# Patient Record
Sex: FEMALE | Race: ASIAN | Employment: STUDENT | URBAN - METROPOLITAN AREA
[De-identification: names, ages, dates, MRNs, and addresses within clinical notes are randomized per-mention and may not be internally consistent; named-entity substitution may affect disease eponyms.]

---
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
## 2019-01-11 PROBLEM — F32.1 CURRENT MODERATE EPISODE OF MAJOR DEPRESSIVE DISORDER WITHOUT PRIOR EPISODE (HCC): Status: ACTIVE | Noted: 2019-01-11

## 2019-02-22 PROBLEM — M20.42 HAMMER TOE OF LEFT FOOT: Status: ACTIVE | Noted: 2019-02-22

## 2019-02-22 PROBLEM — L84 CORN OF TOE: Status: ACTIVE | Noted: 2019-02-22

## 2019-04-01 ENCOUNTER — OFFICE VISIT (OUTPATIENT)
Dept: INTEGRATIVE MEDICINE | Facility: CLINIC | Age: 19
End: 2019-04-01

## 2019-04-01 DIAGNOSIS — Z71.3 NUTRITIONAL COUNSELING: ICD-10-CM

## 2019-04-01 DIAGNOSIS — R63.4 WEIGHT LOSS: ICD-10-CM

## 2019-04-01 DIAGNOSIS — F32.1 CURRENT MODERATE EPISODE OF MAJOR DEPRESSIVE DISORDER WITHOUT PRIOR EPISODE (HCC): ICD-10-CM

## 2019-04-01 PROCEDURE — S9452 NUTRITION CLASS: HCPCS | Performed by: NUTRITIONIST

## 2019-04-02 NOTE — PATIENT INSTRUCTIONS
The products and items listed below (the “Products”)  and their claims have not been evaluated by the Food and Drug Administration. Dietary products are not intended to treat, prevent, mitigate or cure disease.  Ultimately, you must draw your own conclusi Add 1 tablespoon to smoothie in the morning and 1 tablespoon at every serving of quinoa or white rice  ________________  For any questions or concerns you may have, please do not hesitate to contact via   My Chart OR email- [email or web address]

## 2019-04-10 NOTE — PROGRESS NOTES
Patient referred by Dr. Fry ref. provider found  Did patient complete Nutritional Therapy Questionnaire?  YES  Presents w/ parents    Mar Kelly is a 22year old female presenting for medical nutrition therapy in regards to anxiety/depression, weight loss health and weight. Can also increase fiber by taking flaxseed at meal times. Flaxeeds contain omega 3 which can help with overall mood and stability. Focus on digestion to optimize overall health. Has strong support system.       Plan and Future Conside